# Patient Record
Sex: MALE | Race: WHITE | URBAN - METROPOLITAN AREA
[De-identification: names, ages, dates, MRNs, and addresses within clinical notes are randomized per-mention and may not be internally consistent; named-entity substitution may affect disease eponyms.]

---

## 2020-08-01 ENCOUNTER — OFFICE VISIT (OUTPATIENT)
Dept: URBAN - METROPOLITAN AREA TELEHEALTH 2 | Facility: TELEHEALTH | Age: 75
End: 2020-08-01
Payer: MEDICARE

## 2020-08-01 DIAGNOSIS — R10.31 RLQ ABDOMINAL PAIN: ICD-10-CM

## 2020-08-01 DIAGNOSIS — K21.0 GASTROESOPHAGEAL REFLUX DISEASE (GERD): ICD-10-CM

## 2020-08-01 PROCEDURE — 99214 OFFICE O/P EST MOD 30 MIN: CPT | Performed by: INTERNAL MEDICINE

## 2020-08-01 PROCEDURE — G8420 CALC BMI NORM PARAMETERS: HCPCS | Performed by: INTERNAL MEDICINE

## 2020-08-01 PROCEDURE — 1036F TOBACCO NON-USER: CPT | Performed by: INTERNAL MEDICINE

## 2020-08-01 PROCEDURE — G9903 PT SCRN TBCO ID AS NON USER: HCPCS | Performed by: INTERNAL MEDICINE

## 2020-08-01 PROCEDURE — G8427 DOCREV CUR MEDS BY ELIG CLIN: HCPCS | Performed by: INTERNAL MEDICINE

## 2020-08-01 RX ORDER — ZOLPIDEM TARTRATE 5 MG/1
TAKE 1 TABLET (5 MG) BY ORAL ROUTE ONCE DAILY AT BEDTIME TABLET, FILM COATED ORAL 1
Qty: 30 | Refills: 3 | Status: ACTIVE | COMMUNITY
Start: 2018-08-31

## 2020-08-01 RX ORDER — FAMOTIDINE 20 MG/1
1 TABLET AT BEDTIME AS NEEDED TABLET, FILM COATED ORAL ONCE A DAY
Qty: 30 TABLET | Status: ACTIVE | COMMUNITY

## 2020-08-01 RX ORDER — ASPIRIN 81 MG/1
TABLET, CHEWABLE ORAL
Qty: 0 | Refills: 0 | Status: ACTIVE | COMMUNITY
Start: 1900-01-01

## 2020-08-01 RX ORDER — ESCITALOPRAM 10 MG/1
TAKE 1 TABLET (10 MG) BY ORAL ROUTE ONCE DAILY TABLET, FILM COATED ORAL 1
Qty: 0 | Refills: 0 | Status: ACTIVE | COMMUNITY
Start: 1900-01-01

## 2020-08-01 RX ORDER — ROSUVASTATIN CALCIUM 10 MG/1
1 TABLET TABLET, FILM COATED ORAL ONCE A DAY
Qty: 30 TABLET | Status: ACTIVE | COMMUNITY

## 2020-08-01 RX ORDER — LEVOTHYROXINE SODIUM 50 UG/1
1 TABLET IN THE MORNING ON AN EMPTY STOMACH TABLET ORAL ONCE A DAY
Qty: 30 TABLET | Status: ACTIVE | COMMUNITY

## 2020-08-01 RX ORDER — METOPROLOL TARTRATE 25 MG/1
TAKE 1 TABLET (25 MG) BY ORAL ROUTE 2 TIMES PER DAY TABLET, FILM COATED ORAL 2
Qty: 0 | Refills: 0 | Status: ACTIVE | COMMUNITY
Start: 1900-01-01

## 2020-08-01 NOTE — HPI-TODAY'S VISIT:
76 yo pt w/ a mo hx of severe and intermittent RLQ abdominal pain, sharp / cramping, lasting up to 10 to 20 min, in 3 occasions this past mo, most recently a wk ago, sudden onset, no triggers, usually at rest,  w/ concomitant tiredness, fatigue w/o fever, chills and no cardiorespiratory sxs. Denies melenic stools nor hematochezia. He denies urologic sxs. Today, he's asx/ No other complaints. No change in diet nor Rx. He has been COVID-19 negative x 3.  Previous EGD: NERD, GE junction non-bleeding ulcer, sm HH, Hp-negative antral gastritis and ulcerative duodenitis. Colonoscopy: L-tics and TA / AC.  He has been in Rehab for foot drop.

## 2020-08-06 LAB
A/G RATIO: 1.6
ALBUMIN: 4.3
ALKALINE PHOSPHATASE: 94
ALT (SGPT): 22
APPEARANCE: CLEAR
AST (SGOT): 27
BACTERIA: (no result)
BASO (ABSOLUTE): 0
BASOS: 1
BILIRUBIN, TOTAL: 0.9
BILIRUBIN: NEGATIVE
BUN/CREATININE RATIO: 14
BUN: 13
C-REACTIVE PROTEIN, QUANT: 1
CALCIUM: 9.9
CARBON DIOXIDE, TOTAL: 24
CAST TYPE: (no result)
CASTS: (no result)
CHLORIDE: 102
COMMENT: (no result)
CREATININE: 0.93
CRYSTAL TYPE: (no result)
CRYSTALS: (no result)
EGFR IF AFRICN AM: 92
EGFR IF NONAFRICN AM: 79
EOS (ABSOLUTE): 0.3
EOS: 5
EPITHELIAL CELLS (NON RENAL): (no result)
EPITHELIAL CELLS (RENAL): (no result)
GLOBULIN, TOTAL: 2.7
GLUCOSE: 106
GLUCOSE: NEGATIVE
HEMATOCRIT: 43.7
HEMATOLOGY COMMENTS:: (no result)
HEMOGLOBIN: 14.9
IMMATURE CELLS: (no result)
IMMATURE GRANS (ABS): 0
IMMATURE GRANULOCYTES: 0
KETONES: NEGATIVE
LIPASE: 44
LYMPHS (ABSOLUTE): 2.3
LYMPHS: 35
MCH: 30.8
MCHC: 34.1
MCV: 90
MICROSCOPIC EXAMINATION: (no result)
MONOCYTES(ABSOLUTE): 0.7
MONOCYTES: 11
MUCUS THREADS: PRESENT
NEUTROPHILS (ABSOLUTE): 3.3
NEUTROPHILS: 48
NITRITE, URINE: NEGATIVE
NRBC: (no result)
OCCULT BLOOD: NEGATIVE
PH: 7
PLATELETS: 191
POTASSIUM: 4.4
PROTEIN, TOTAL: 7
PROTEIN: NEGATIVE
RBC: (no result)
RBC: 4.84
RDW: 13.2
SODIUM: 141
SPECIFIC GRAVITY: 1.01
TRICHOMONAS: (no result)
URINE-COLOR: YELLOW
UROBILINOGEN,SEMI-QN: 0.2
WBC ESTERASE: (no result)
WBC: (no result)
WBC: 6.7
YEAST: (no result)

## 2020-08-07 ENCOUNTER — TELEPHONE ENCOUNTER (OUTPATIENT)
Dept: URBAN - METROPOLITAN AREA CLINIC 98 | Facility: CLINIC | Age: 75
End: 2020-08-07

## 2020-08-12 ENCOUNTER — TELEPHONE ENCOUNTER (OUTPATIENT)
Dept: URBAN - METROPOLITAN AREA CLINIC 92 | Facility: CLINIC | Age: 75
End: 2020-08-12

## 2020-11-05 ENCOUNTER — TELEPHONE ENCOUNTER (OUTPATIENT)
Dept: URBAN - METROPOLITAN AREA CLINIC 98 | Facility: CLINIC | Age: 75
End: 2020-11-05

## 2020-11-17 ENCOUNTER — TELEPHONE ENCOUNTER (OUTPATIENT)
Dept: URBAN - METROPOLITAN AREA CLINIC 98 | Facility: CLINIC | Age: 75
End: 2020-11-17

## 2020-11-17 RX ORDER — OMEPRAZOLE 20 MG/1
1 CAPSULE 30 MINUTES BEFORE MORNING MEAL CAPSULE, DELAYED RELEASE ORAL ONCE A DAY
Qty: 90 | Refills: 3 | OUTPATIENT
Start: 2020-11-17

## 2020-11-19 ENCOUNTER — ERX REFILL RESPONSE (OUTPATIENT)
Age: 75
End: 2020-11-19

## 2020-11-19 RX ORDER — OMEPRAZOLE 20 MG/1
TAKE 1 CAPSULE (20 MG) BY ORAL ROUTE ONCE DAILY BEFORE A MEAL FOR 90 DAYS CAPSULE, DELAYED RELEASE ORAL
Qty: 90 | Refills: 3

## 2021-09-01 ENCOUNTER — OFFICE VISIT (OUTPATIENT)
Dept: URBAN - METROPOLITAN AREA CLINIC 98 | Facility: CLINIC | Age: 76
End: 2021-09-01
Payer: MEDICARE

## 2021-09-01 ENCOUNTER — WEB ENCOUNTER (OUTPATIENT)
Dept: URBAN - METROPOLITAN AREA CLINIC 98 | Facility: CLINIC | Age: 76
End: 2021-09-01

## 2021-09-01 DIAGNOSIS — K52.9 CHRONIC DIARRHEA: ICD-10-CM

## 2021-09-01 DIAGNOSIS — K21.9 GERD WITHOUT ESOPHAGITIS: ICD-10-CM

## 2021-09-01 PROCEDURE — 99214 OFFICE O/P EST MOD 30 MIN: CPT | Performed by: INTERNAL MEDICINE

## 2021-09-01 RX ORDER — ROSUVASTATIN CALCIUM 10 MG/1
1 TABLET TABLET, FILM COATED ORAL ONCE A DAY
Qty: 30 TABLET | Status: ACTIVE | COMMUNITY

## 2021-09-01 RX ORDER — LEVOTHYROXINE SODIUM 50 UG/1
1 TABLET IN THE MORNING ON AN EMPTY STOMACH TABLET ORAL ONCE A DAY
Qty: 30 TABLET | Status: ACTIVE | COMMUNITY

## 2021-09-01 RX ORDER — ZOLPIDEM TARTRATE 5 MG/1
TAKE 1 TABLET (5 MG) BY ORAL ROUTE ONCE DAILY AT BEDTIME TABLET, FILM COATED ORAL 1
Qty: 30 | Refills: 3 | Status: ACTIVE | COMMUNITY
Start: 2018-08-31

## 2021-09-01 RX ORDER — OMEPRAZOLE 20 MG/1
TAKE 1 CAPSULE (20 MG) BY ORAL ROUTE ONCE DAILY BEFORE A MEAL FOR 90 DAYS CAPSULE, DELAYED RELEASE ORAL
Qty: 90 | Refills: 3

## 2021-09-01 RX ORDER — METOPROLOL TARTRATE 25 MG/1
TAKE 1 TABLET (25 MG) BY ORAL ROUTE 2 TIMES PER DAY TABLET, FILM COATED ORAL 2
Qty: 0 | Refills: 0 | Status: ACTIVE | COMMUNITY
Start: 1900-01-01

## 2021-09-01 RX ORDER — FAMOTIDINE 20 MG/1
1 TABLET AT BEDTIME AS NEEDED TABLET, FILM COATED ORAL ONCE A DAY
Qty: 30 TABLET | Status: ACTIVE | COMMUNITY

## 2021-09-01 RX ORDER — ASPIRIN 81 MG/1
TABLET, CHEWABLE ORAL
Qty: 0 | Refills: 0 | Status: ACTIVE | COMMUNITY
Start: 1900-01-01

## 2021-09-01 RX ORDER — OMEPRAZOLE 20 MG/1
TAKE 1 CAPSULE (20 MG) BY ORAL ROUTE ONCE DAILY BEFORE A MEAL FOR 90 DAYS CAPSULE, DELAYED RELEASE ORAL
Qty: 90 | Refills: 3 | Status: ACTIVE | COMMUNITY

## 2021-09-01 RX ORDER — CHOLESTYRAMINE 4 G/9G
1 SCOOP POWDER, FOR SUSPENSION ORAL ONCE A DAY
Qty: 30 | Refills: 3 | OUTPATIENT
Start: 2021-09-01

## 2021-09-01 RX ORDER — ESCITALOPRAM 10 MG/1
TAKE 1 TABLET (10 MG) BY ORAL ROUTE ONCE DAILY TABLET, FILM COATED ORAL 1
Qty: 0 | Refills: 0 | Status: ACTIVE | COMMUNITY
Start: 1900-01-01

## 2021-09-01 NOTE — HPI-TODAY'S VISIT:
75 yo pt w/ a mo hx of watery to loose non-bloody, explosive diarrhea w/o abdominal pain and no fever or chills.   No use of abx's and no change in diet. NO recent travel. Started Flaxseed supplement w good results. Had a small and formed bm' this am. Has no other complaints. Had recent orthopedic surgery. Denies melenic stools nor hematochezia. He denies urologic sxs. NO cardiorespiratory sxs.  No change in diet nor Rx. He has been COVID-19 negative x 3.  No COVID-19 expsosure and has received COVID-19 vaccine. Previous EGD: NERD, GE junction non-bleeding ulcer, sm HH, Hp-negative antral gastritis and ulcerative duodenitis. Colonoscopy: L-tics and TA / AC.  He has been in Rehab for foot drop.

## 2021-09-03 ENCOUNTER — OFFICE VISIT (OUTPATIENT)
Dept: URBAN - METROPOLITAN AREA TELEHEALTH 2 | Facility: TELEHEALTH | Age: 76
End: 2021-09-03

## 2021-09-03 ENCOUNTER — LAB OUTSIDE AN ENCOUNTER (OUTPATIENT)
Dept: URBAN - METROPOLITAN AREA CLINIC 98 | Facility: CLINIC | Age: 76
End: 2021-09-03

## 2021-09-03 LAB
DEAMIDATED GLIADIN ABS, IGA: 5
DEAMIDATED GLIADIN ABS, IGG: 2
ENDOMYSIAL ANTIBODY IGA: NEGATIVE
HEMATOCRIT: 43.7
HEMOGLOBIN: 14.3
IMMUNOGLOBULIN A, QN, SERUM: 198
MCH: 30.7
MCHC: 32.7
MCV: 94
NRBC: (no result)
PLATELETS: 206
RBC: 4.66
RDW: 14
T-TRANSGLUTAMINASE (TTG) IGA: <2
T-TRANSGLUTAMINASE (TTG) IGG: 5
WBC: 6.6

## 2021-09-10 LAB
C DIFFICILE TOXIN GENE NAA: NEGATIVE
C DIFFICILE TOXINS A+B, EIA: NEGATIVE
CALPROTECTIN, FECAL: <16
CAMPYLOBACTER CULTURE: (no result)
E COLI SHIGA TOXIN EIA: NEGATIVE
Lab: (no result)
Lab: (no result)
PANCREATIC ELASTASE, FECAL: 448
SALMONELLA/SHIGELLA SCREEN: (no result)

## 2021-09-23 ENCOUNTER — ERX REFILL RESPONSE (OUTPATIENT)
Dept: URBAN - METROPOLITAN AREA CLINIC 98 | Facility: CLINIC | Age: 76
End: 2021-09-23

## 2021-09-23 RX ORDER — CHOLESTYRAMINE 4 G/9G
1 SCOOP POWDER, FOR SUSPENSION ORAL ONCE A DAY
Qty: 30 | Refills: 3 | OUTPATIENT

## 2021-09-23 RX ORDER — CHOLESTYRAMINE 4 G/9G
TAKE 1 SCOOP ORALLY ONCE A DAY 30 DAY(S) POWDER, FOR SUSPENSION ORAL
Qty: 378 GRAM | Refills: 4 | OUTPATIENT

## 2021-09-29 ENCOUNTER — P2P PATIENT RECORD (OUTPATIENT)
Age: 76
End: 2021-09-29

## 2021-10-06 ENCOUNTER — OFFICE VISIT (OUTPATIENT)
Dept: URBAN - METROPOLITAN AREA CLINIC 98 | Facility: CLINIC | Age: 76
End: 2021-10-06

## 2021-10-10 ENCOUNTER — P2P PATIENT RECORD (OUTPATIENT)
Age: 76
End: 2021-10-10

## 2021-10-11 ENCOUNTER — P2P PATIENT RECORD (OUTPATIENT)
Age: 76
End: 2021-10-11

## 2021-10-12 ENCOUNTER — P2P PATIENT RECORD (OUTPATIENT)
Age: 76
End: 2021-10-12

## 2021-12-19 ENCOUNTER — ERX REFILL RESPONSE (OUTPATIENT)
Dept: URBAN - METROPOLITAN AREA CLINIC 98 | Facility: CLINIC | Age: 76
End: 2021-12-19

## 2021-12-19 RX ORDER — CHOLESTYRAMINE 4 G/9G
TAKE 1 SCOOP ORALLY ONCE A DAY 30 DAY(S) POWDER, FOR SUSPENSION ORAL
Qty: 1134 GRAM | Refills: 2 | OUTPATIENT

## 2021-12-19 RX ORDER — CHOLESTYRAMINE 4 G/9G
TAKE 1 SCOOP ORALLY ONCE A DAY 30 DAY(S) POWDER, FOR SUSPENSION ORAL
Qty: 378 GRAM | Refills: 4 | OUTPATIENT

## 2022-03-31 ENCOUNTER — TELEPHONE ENCOUNTER (OUTPATIENT)
Dept: URBAN - METROPOLITAN AREA CLINIC 98 | Facility: CLINIC | Age: 77
End: 2022-03-31

## 2022-04-05 ENCOUNTER — OFFICE VISIT (OUTPATIENT)
Dept: URBAN - METROPOLITAN AREA CLINIC 98 | Facility: CLINIC | Age: 77
End: 2022-04-05
Payer: MEDICARE

## 2022-04-05 VITALS
HEIGHT: 68 IN | SYSTOLIC BLOOD PRESSURE: 142 MMHG | HEART RATE: 72 BPM | TEMPERATURE: 97.2 F | BODY MASS INDEX: 35.8 KG/M2 | DIASTOLIC BLOOD PRESSURE: 83 MMHG | WEIGHT: 236.2 LBS

## 2022-04-05 DIAGNOSIS — E66.9 OBESITY (BMI 30.0-34.9): ICD-10-CM

## 2022-04-05 DIAGNOSIS — K21.9 GERD WITHOUT ESOPHAGITIS: ICD-10-CM

## 2022-04-05 DIAGNOSIS — R10.32 LLQ ABDOMINAL PAIN: ICD-10-CM

## 2022-04-05 DIAGNOSIS — K57.90 DIVERTICULOSIS: ICD-10-CM

## 2022-04-05 PROCEDURE — 99214 OFFICE O/P EST MOD 30 MIN: CPT | Performed by: INTERNAL MEDICINE

## 2022-04-05 RX ORDER — METOPROLOL TARTRATE 25 MG/1
TAKE 1 TABLET (25 MG) BY ORAL ROUTE 2 TIMES PER DAY TABLET, FILM COATED ORAL 2
Qty: 0 | Refills: 0 | Status: ACTIVE | COMMUNITY
Start: 1900-01-01

## 2022-04-05 RX ORDER — ESCITALOPRAM OXALATE 10 MG/1
1 TABLET TABLET, FILM COATED ORAL ONCE A DAY
Status: ACTIVE | COMMUNITY

## 2022-04-05 RX ORDER — CHOLESTYRAMINE 4 G/9G
TAKE 1 SCOOP ORALLY ONCE A DAY 30 DAY(S) POWDER, FOR SUSPENSION ORAL
Qty: 1134 GRAM | Refills: 2 | Status: ACTIVE | COMMUNITY

## 2022-04-05 RX ORDER — OMEPRAZOLE 20 MG/1
TAKE 1 CAPSULE (20 MG) BY ORAL ROUTE ONCE DAILY BEFORE A MEAL FOR 90 DAYS CAPSULE, DELAYED RELEASE ORAL
Qty: 90 | Refills: 3 | Status: ACTIVE | COMMUNITY

## 2022-04-05 RX ORDER — ASPIRIN 81 MG/1
TABLET, CHEWABLE ORAL
Qty: 0 | Refills: 0 | Status: ACTIVE | COMMUNITY
Start: 1900-01-01

## 2022-04-05 RX ORDER — ESCITALOPRAM 10 MG/1
TAKE 1 TABLET (10 MG) BY ORAL ROUTE ONCE DAILY TABLET, FILM COATED ORAL 1
Qty: 0 | Refills: 0 | Status: ON HOLD | COMMUNITY
Start: 1900-01-01

## 2022-04-05 RX ORDER — LEVOTHYROXINE SODIUM 50 UG/1
1 TABLET IN THE MORNING ON AN EMPTY STOMACH TABLET ORAL ONCE A DAY
Qty: 30 TABLET | Status: ACTIVE | COMMUNITY

## 2022-04-05 RX ORDER — OMEGA-3 FATTY ACIDS/FISH OIL 300-1000MG
1 CAPSULE CAPSULE ORAL ONCE A DAY
Status: ACTIVE | COMMUNITY

## 2022-04-05 RX ORDER — ROSUVASTATIN CALCIUM 10 MG/1
1 TABLET TABLET, FILM COATED ORAL ONCE A DAY
Qty: 30 TABLET | Status: ACTIVE | COMMUNITY

## 2022-04-05 NOTE — HPI-TODAY'S VISIT:
76 yo pt w/  sigmoid diverticulosis, hypothyroidism, GERD, IBS-D s/p recent L-THR  s/p LS-fusion, here for evaluation of abdominal pain. Has been c/o for past couple of days of LLQ discomfort, intermittent, worst w decubitus , unrelated to eating, radiated to the L-lower back, wo diarrhea nor constipation. No fever, chills or night sweats. No melenic stools and no hematochezia.  He denies urologic sxs. NO cardiorespiratory sxs.  No change in diet nor Rx. He has been COVID-19 negative x 3.  No COVID-19 expsosure and has received COVID-19 vaccine. Previous EGD: NERD, GE junction non-bleeding ulcer, sm HH, Hp-negative antral gastritis and ulcerative duodenitis. Colonoscopy: L-tics and TA / AC.  He has been in Rehab for foot drop. No other complaints. MOSES sxs controlled w diet and Omperazole qd. Has gained some weight lately due to  post-op inacitivity.

## 2022-06-21 ENCOUNTER — OFFICE VISIT (OUTPATIENT)
Dept: URBAN - METROPOLITAN AREA TELEHEALTH 2 | Facility: TELEHEALTH | Age: 77
End: 2022-06-21
Payer: MEDICARE

## 2022-06-21 DIAGNOSIS — R19.7 DIARRHEA OF PRESUMED INFECTIOUS ORIGIN: ICD-10-CM

## 2022-06-21 DIAGNOSIS — U07.1 COVID-19: ICD-10-CM

## 2022-06-21 DIAGNOSIS — K21.9 GERD WITHOUT ESOPHAGITIS: ICD-10-CM

## 2022-06-21 DIAGNOSIS — K57.90 DIVERTICULOSIS: ICD-10-CM

## 2022-06-21 PROCEDURE — 99214 OFFICE O/P EST MOD 30 MIN: CPT | Performed by: INTERNAL MEDICINE

## 2022-06-21 RX ORDER — ESCITALOPRAM OXALATE 10 MG/1
1 TABLET TABLET, FILM COATED ORAL ONCE A DAY
Status: ACTIVE | COMMUNITY

## 2022-06-21 RX ORDER — OMEPRAZOLE 20 MG/1
TAKE 1 CAPSULE (20 MG) BY ORAL ROUTE ONCE DAILY BEFORE A MEAL FOR 90 DAYS CAPSULE, DELAYED RELEASE ORAL
Qty: 90 | Refills: 3 | Status: ACTIVE | COMMUNITY

## 2022-06-21 RX ORDER — OMEGA-3 FATTY ACIDS/FISH OIL 300-1000MG
1 CAPSULE CAPSULE ORAL ONCE A DAY
Status: ACTIVE | COMMUNITY

## 2022-06-21 RX ORDER — ASPIRIN 81 MG/1
TABLET, CHEWABLE ORAL
Qty: 0 | Refills: 0 | Status: ACTIVE | COMMUNITY
Start: 1900-01-01

## 2022-06-21 RX ORDER — METOPROLOL TARTRATE 25 MG/1
TAKE 1 TABLET (25 MG) BY ORAL ROUTE 2 TIMES PER DAY TABLET, FILM COATED ORAL 2
Qty: 0 | Refills: 0 | Status: ACTIVE | COMMUNITY
Start: 1900-01-01

## 2022-06-21 RX ORDER — CHOLESTYRAMINE 4 G/9G
TAKE 1 SCOOP ORALLY ONCE A DAY 30 DAY(S) POWDER, FOR SUSPENSION ORAL
Qty: 1134 GRAM | Refills: 2 | Status: ACTIVE | COMMUNITY

## 2022-06-21 RX ORDER — ROSUVASTATIN CALCIUM 10 MG/1
1 TABLET TABLET, FILM COATED ORAL ONCE A DAY
Qty: 30 TABLET | Status: ACTIVE | COMMUNITY

## 2022-06-21 RX ORDER — LEVOTHYROXINE SODIUM 50 UG/1
1 TABLET IN THE MORNING ON AN EMPTY STOMACH TABLET ORAL ONCE A DAY
Qty: 30 TABLET | Status: ACTIVE | COMMUNITY

## 2022-06-21 RX ORDER — ESCITALOPRAM 10 MG/1
TAKE 1 TABLET (10 MG) BY ORAL ROUTE ONCE DAILY TABLET, FILM COATED ORAL 1
Qty: 0 | Refills: 0 | Status: ON HOLD | COMMUNITY
Start: 1900-01-01

## 2022-06-21 NOTE — HPI-TODAY'S VISIT:
This is  a Telehealth OV to which patient has agreed to. Limitations of telehealth discussed; he understands and agrees to proceed.. Patient's @ home during this virtual OV. 78 yo pt w/  sigmoid diverticulosis, hypothyroidism, GERD, IBS-D s/p recent L-THR  s/p LS-fusion, here for evaluation of diarrhea. He was admitted to [facility] 2 weeks ago w COVID-19 pneumonia and DVT w PE. His admission was preceded by 3 weeks of chronic cough due to Bronchitis, Rx'd w abx's  / steroids.  For past 2 weeks, has been experiencing explosive diarrhea on 4 occasions, wo known trigger. He has  normally  1 to 2 soft, non-bloody bm's qd.No nocturnal diarrhea. NO melenic stools nor fever. No anorexia or weight loss. Has been on Eliquis / Dexamethasone. He's still has coughing, ALVARADO wo DSOB at rest and no CP nor fever. He denies urologic sxs. No change in diet nor Rx. He has been COVID-19 negative x 3.  Has received COVID-19 vaccine. Previous EGD: NERD, GE junction non-bleeding ulcer, sm HH, Hp-negative antral gastritis and ulcerative duodenitis. Colonoscopy: L-tics and TA / AC.   No other complaints. MOSES sxs controlled w diet and Omperazole qd. Has gained some weight lately due to  post-op inacitivity.

## 2022-08-30 ENCOUNTER — OFFICE VISIT (OUTPATIENT)
Dept: URBAN - METROPOLITAN AREA TELEHEALTH 2 | Facility: TELEHEALTH | Age: 77
End: 2022-08-30
Payer: MEDICARE

## 2022-08-30 DIAGNOSIS — K21.9 GERD WITHOUT ESOPHAGITIS: ICD-10-CM

## 2022-08-30 DIAGNOSIS — K57.90 DIVERTICULOSIS: ICD-10-CM

## 2022-08-30 DIAGNOSIS — R19.7 DIARRHEA OF PRESUMED INFECTIOUS ORIGIN: ICD-10-CM

## 2022-08-30 DIAGNOSIS — U07.1 COVID-19: ICD-10-CM

## 2022-08-30 DIAGNOSIS — C91.00 ACUTE LYMPHOBLASTIC LEUKEMIA (ALL) NOT HAVING ACHIEVED REMISSION: ICD-10-CM

## 2022-08-30 DIAGNOSIS — E66.9 OBESITY (BMI 30.0-34.9): ICD-10-CM

## 2022-08-30 PROBLEM — 91857003: Status: ACTIVE | Noted: 2022-08-30

## 2022-08-30 PROBLEM — 266435005: Status: ACTIVE | Noted: 2021-09-07

## 2022-08-30 PROBLEM — 397881000: Status: ACTIVE | Noted: 2022-04-07

## 2022-08-30 PROBLEM — 443371000124107: Status: ACTIVE | Noted: 2022-04-07

## 2022-08-30 PROCEDURE — 99214 OFFICE O/P EST MOD 30 MIN: CPT | Performed by: INTERNAL MEDICINE

## 2022-08-30 RX ORDER — ROSUVASTATIN CALCIUM 10 MG/1
1 TABLET TABLET, FILM COATED ORAL ONCE A DAY
Qty: 30 TABLET | Status: ACTIVE | COMMUNITY

## 2022-08-30 RX ORDER — LEVOTHYROXINE SODIUM 50 UG/1
1 TABLET IN THE MORNING ON AN EMPTY STOMACH TABLET ORAL ONCE A DAY
Qty: 30 TABLET | Status: ACTIVE | COMMUNITY

## 2022-08-30 RX ORDER — METOPROLOL TARTRATE 25 MG/1
TAKE 1 TABLET (25 MG) BY ORAL ROUTE 2 TIMES PER DAY TABLET, FILM COATED ORAL 2
Qty: 0 | Refills: 0 | Status: ACTIVE | COMMUNITY
Start: 1900-01-01

## 2022-08-30 RX ORDER — OMEPRAZOLE 20 MG/1
TAKE 1 CAPSULE (20 MG) BY ORAL ROUTE ONCE DAILY BEFORE A MEAL FOR 90 DAYS CAPSULE, DELAYED RELEASE ORAL
Qty: 90 | Refills: 3 | Status: ACTIVE | COMMUNITY

## 2022-08-30 RX ORDER — CHOLESTYRAMINE 4 G/9G
TAKE 1 SCOOP ORALLY ONCE A DAY 30 DAY(S) POWDER, FOR SUSPENSION ORAL
Qty: 1134 GRAM | Refills: 2 | Status: ACTIVE | COMMUNITY

## 2022-08-30 RX ORDER — OMEGA-3 FATTY ACIDS/FISH OIL 300-1000MG
1 CAPSULE CAPSULE ORAL ONCE A DAY
Status: ACTIVE | COMMUNITY

## 2022-08-30 RX ORDER — ESCITALOPRAM 10 MG/1
TAKE 1 TABLET (10 MG) BY ORAL ROUTE ONCE DAILY TABLET, FILM COATED ORAL 1
Qty: 0 | Refills: 0 | Status: ON HOLD | COMMUNITY
Start: 1900-01-01

## 2022-08-30 RX ORDER — ESCITALOPRAM OXALATE 10 MG/1
1 TABLET TABLET, FILM COATED ORAL ONCE A DAY
Status: ACTIVE | COMMUNITY

## 2022-08-30 RX ORDER — ASPIRIN 81 MG/1
TABLET, CHEWABLE ORAL
Qty: 0 | Refills: 0 | Status: ACTIVE | COMMUNITY
Start: 1900-01-01

## 2022-08-30 NOTE — HPI-TODAY'S VISIT:
This is  a Telehealth OV to which patient has agreed to. Limitations of telehealth discussed; he understands and agrees to proceed. Patient's @ home during this virtual OV. Patient's wife present during this virtual OV. 78 yo pt w/  sigmoid diverticulosis, hypothyroidism, GERD, IBS-D s/p recent L-THR  s/p LS-fusion, here for follow up. In  5/22 he was dx'd w COVID-19 bronchitis / Pneumonia / PE admitted to [facility] for 2 weeks. 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 Has upcoming appointment w Oncology next week. He reports feeling well overall. No abdominal pain, diarrhea, fatigue nor cardiorespiratory sxs. Lost ~ 20 lbs sine his Hospital admission.  Has no diarrhea He has  normal   1 to 2 soft, non-bloody bm's qd. . NO melenic stools nor fever.  MOSES sxs controlled w diet Has been off ASA / Fe / MVI / Questran / Omeprazole. Has been on Eliquis.  Has received COVID-19 vaccine. Previous EGD: NERD, GE junction non-bleeding ulcer, sm HH, Hp-negative antral gastritis and ulcerative duodenitis. Colonoscopy: L-tics and TA / AC.   No other complaints.

## 2023-01-25 ENCOUNTER — LAB OUTSIDE AN ENCOUNTER (OUTPATIENT)
Dept: URBAN - METROPOLITAN AREA CLINIC 98 | Facility: CLINIC | Age: 78
End: 2023-01-25

## 2023-01-25 ENCOUNTER — OFFICE VISIT (OUTPATIENT)
Dept: URBAN - METROPOLITAN AREA CLINIC 98 | Facility: CLINIC | Age: 78
End: 2023-01-25
Payer: MEDICARE

## 2023-01-25 VITALS
WEIGHT: 197 LBS | DIASTOLIC BLOOD PRESSURE: 59 MMHG | SYSTOLIC BLOOD PRESSURE: 114 MMHG | HEART RATE: 92 BPM | HEIGHT: 68 IN | TEMPERATURE: 98.1 F | BODY MASS INDEX: 29.86 KG/M2

## 2023-01-25 DIAGNOSIS — R10.32 LLQ ABDOMINAL PAIN: ICD-10-CM

## 2023-01-25 DIAGNOSIS — K52.9 CHRONIC DIARRHEA: ICD-10-CM

## 2023-01-25 PROCEDURE — 99214 OFFICE O/P EST MOD 30 MIN: CPT | Performed by: INTERNAL MEDICINE

## 2023-01-25 RX ORDER — ROSUVASTATIN CALCIUM 10 MG/1
1 TABLET TABLET, FILM COATED ORAL ONCE A DAY
Qty: 30 TABLET | Status: ACTIVE | COMMUNITY

## 2023-01-25 RX ORDER — ESCITALOPRAM OXALATE 10 MG/1
1 TABLET TABLET, FILM COATED ORAL ONCE A DAY
Status: ACTIVE | COMMUNITY

## 2023-01-25 RX ORDER — OMEGA-3 FATTY ACIDS/FISH OIL 300-1000MG
1 CAPSULE CAPSULE ORAL ONCE A DAY
Status: ACTIVE | COMMUNITY

## 2023-01-25 RX ORDER — OMEPRAZOLE 20 MG/1
TAKE 1 CAPSULE (20 MG) BY ORAL ROUTE ONCE DAILY BEFORE A MEAL FOR 90 DAYS CAPSULE, DELAYED RELEASE ORAL
Qty: 90 | Refills: 3 | Status: ACTIVE | COMMUNITY

## 2023-01-25 RX ORDER — ASPIRIN 81 MG/1
TABLET, CHEWABLE ORAL
Qty: 0 | Refills: 0 | Status: ACTIVE | COMMUNITY
Start: 1900-01-01

## 2023-01-25 RX ORDER — ESCITALOPRAM 10 MG/1
TAKE 1 TABLET (10 MG) BY ORAL ROUTE ONCE DAILY TABLET, FILM COATED ORAL 1
Qty: 0 | Refills: 0 | Status: ON HOLD | COMMUNITY
Start: 1900-01-01

## 2023-01-25 RX ORDER — METOPROLOL TARTRATE 25 MG/1
TAKE 1 TABLET (25 MG) BY ORAL ROUTE 2 TIMES PER DAY TABLET, FILM COATED ORAL 2
Qty: 0 | Refills: 0 | Status: ACTIVE | COMMUNITY
Start: 1900-01-01

## 2023-01-25 RX ORDER — LEVOTHYROXINE SODIUM 50 UG/1
1 TABLET IN THE MORNING ON AN EMPTY STOMACH TABLET ORAL ONCE A DAY
Qty: 30 TABLET | Status: ACTIVE | COMMUNITY

## 2023-01-25 RX ORDER — CHOLESTYRAMINE 4 G/9G
TAKE 1 SCOOP ORALLY ONCE A DAY 30 DAY(S) POWDER, FOR SUSPENSION ORAL
Qty: 1134 GRAM | Refills: 2 | Status: ACTIVE | COMMUNITY

## 2023-01-25 NOTE — HPI-TODAY'S VISIT:
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 In  5/22 he was dx'd w COVID-19 bronchitis / Pneumonia / PE admitted to [facility] for 2 weeks and  Dx'd at that time w ALL s/p CTX now on Sprycel qd being followed by Hematology - Oncology. He will be started on Inclusig ( Ponatinib ) once it's approved by Insurance.  Today, he reports having intermittent explosive, non-bloody diarrhea w urgency and occasional incontinence. He also reports LLQ abdominal pain wo radiation for the past 3 weeks. No cardiorespiratory sxs. Lost ~ 20 lbs since his Hospital admission.  NO melenic stools nor fever.  MOSES sxs controlled w diet Has been off ASA / Fe / MVI / Questran / Omeprazole. Has been on Eliquis.  Has received COVID-19 vaccine. Previous EGD: NERD, GE junction non-bleeding ulcer, sm HH, Hp-negative antral gastritis and ulcerative duodenitis. Colonoscopy: L-tics and TA / AC.   No other complaints.

## 2023-01-26 LAB
C-REACTIVE PROTEIN, QUANT: 2
FERRITIN, SERUM: 621
IRON BIND.CAP.(TIBC): 344
IRON SATURATION: 24
IRON: 83
UIBC: 261

## 2023-01-30 ENCOUNTER — DASHBOARD ENCOUNTERS (OUTPATIENT)
Age: 78
End: 2023-01-30

## 2023-01-31 LAB
C. DIFFICILE TOXIN A/B, STOOL - QDX: NEGATIVE
CALPROTECTIN, STOOL - QDX: (no result)
GASTROINTESTINAL PATHOGEN: (no result)
PANCREATICELASTASE ELISA, STOOL: (no result)

## 2023-02-02 ENCOUNTER — TELEPHONE ENCOUNTER (OUTPATIENT)
Dept: URBAN - METROPOLITAN AREA CLINIC 92 | Facility: CLINIC | Age: 78
End: 2023-02-02

## 2023-02-02 RX ORDER — AMOXICILLIN AND CLAVULANATE POTASSIUM 875; 125 MG/1; MG/1
1 TABLET TABLET, FILM COATED ORAL
Qty: 20 | Refills: 0 | OUTPATIENT
Start: 2023-02-02 | End: 2023-02-12

## 2023-04-12 ENCOUNTER — TELEPHONE ENCOUNTER (OUTPATIENT)
Dept: URBAN - METROPOLITAN AREA CLINIC 98 | Facility: CLINIC | Age: 78
End: 2023-04-12

## 2023-04-13 PROBLEM — 301716002: Status: ACTIVE | Noted: 2023-04-13

## 2023-05-18 ENCOUNTER — TELEPHONE ENCOUNTER (OUTPATIENT)
Dept: URBAN - METROPOLITAN AREA CLINIC 98 | Facility: CLINIC | Age: 78
End: 2023-05-18